# Patient Record
Sex: MALE | Race: WHITE | NOT HISPANIC OR LATINO
[De-identification: names, ages, dates, MRNs, and addresses within clinical notes are randomized per-mention and may not be internally consistent; named-entity substitution may affect disease eponyms.]

---

## 2018-07-18 ENCOUNTER — APPOINTMENT (OUTPATIENT)
Dept: DERMATOLOGY | Facility: CLINIC | Age: 23
End: 2018-07-18
Payer: COMMERCIAL

## 2018-07-18 DIAGNOSIS — H91.90 UNSPECIFIED HEARING LOSS, UNSPECIFIED EAR: ICD-10-CM

## 2018-07-18 DIAGNOSIS — Z87.09 PERSONAL HISTORY OF OTHER DISEASES OF THE RESPIRATORY SYSTEM: ICD-10-CM

## 2018-07-18 DIAGNOSIS — Z80.8 FAMILY HISTORY OF MALIGNANT NEOPLASM OF OTHER ORGANS OR SYSTEMS: ICD-10-CM

## 2018-07-18 DIAGNOSIS — H54.7 UNSPECIFIED VISUAL LOSS: ICD-10-CM

## 2018-07-18 DIAGNOSIS — F32.9 MAJOR DEPRESSIVE DISORDER, SINGLE EPISODE, UNSPECIFIED: ICD-10-CM

## 2018-07-18 PROBLEM — Z00.00 ENCOUNTER FOR PREVENTIVE HEALTH EXAMINATION: Status: ACTIVE | Noted: 2018-07-18

## 2018-07-18 PROCEDURE — 99213 OFFICE O/P EST LOW 20 MIN: CPT

## 2018-11-12 RX ORDER — ADAPALENE 3 MG/G
0.3 GEL TOPICAL DAILY
Qty: 1 | Refills: 3 | Status: DISCONTINUED | COMMUNITY
Start: 2018-07-18 | End: 2018-11-12

## 2019-06-25 ENCOUNTER — APPOINTMENT (OUTPATIENT)
Dept: NEUROLOGY | Facility: CLINIC | Age: 24
End: 2019-06-25
Payer: COMMERCIAL

## 2019-06-25 VITALS
HEART RATE: 97 BPM | SYSTOLIC BLOOD PRESSURE: 130 MMHG | WEIGHT: 262 LBS | HEIGHT: 69 IN | DIASTOLIC BLOOD PRESSURE: 88 MMHG | BODY MASS INDEX: 38.8 KG/M2

## 2019-06-25 DIAGNOSIS — Z82.0 FAMILY HISTORY OF EPILEPSY AND OTHER DISEASES OF THE NERVOUS SYSTEM: ICD-10-CM

## 2019-06-25 DIAGNOSIS — Z78.9 OTHER SPECIFIED HEALTH STATUS: ICD-10-CM

## 2019-06-25 DIAGNOSIS — Z82.61 FAMILY HISTORY OF ARTHRITIS: ICD-10-CM

## 2019-06-25 PROCEDURE — 99205 OFFICE O/P NEW HI 60 MIN: CPT

## 2019-06-25 RX ORDER — DESVENLAFAXINE 50 MG/1
50 TABLET, EXTENDED RELEASE ORAL
Refills: 0 | Status: ACTIVE | COMMUNITY

## 2019-06-25 NOTE — HISTORY OF PRESENT ILLNESS
[FreeTextEntry1] : He reports that he has ADHD.\par He was seeing Dr. Gomes who is a pediatric neurologist and he needs to be transitioned to an adult neurologist.\par He was diagnosed with ADHD at about age 14.\par he was having difficulty focusing in class. He was easily distracted. His mother noticed that he was fidgety.\par His teachers were not complaining of behavior.\par his mother felt that he had "quirky" behaviors and initially brought him to pediatric neurology to be evaluated for autism spectrum disorder. However, when he was evaluated it was noted that his main problem was more of a focus issue.\par He had therapy to help with behavior and socialization when he was younger.\par He also has a lot of anxiety. He has some depression but more of an issue with anxiety.\par He started on medication at about age 14. He started with Concerta. It worked but he had side effects (hallucinations). He was initially switched to Vyvanse. He started at a dose of 30-40 mg and it was gradually increased.\par He did develop some "anxious behaviors" with Vyvanse such as biting his nails. However, he cannot say that he has worsened anxiety with Vyvanse.\par He graduated with a GPA above 100.\par He went to college and graduated with a BA in Biology. 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 He will take a booster of 10 mg of dextroamphetamine at about 6-8 PM. \par He takes Vyvanse on the weekends but does not take dextroamphetamine on the weekends.\par Other than nail biting he is not aware of any side effects from his stimulants.\par He denies having insomnia or palpitations. He had some appetite suppression which is no longer an issue.\par \par He does see a psychiatrist. He finds Pristiq to be helpful. He has not been on other antidepressants.\par \par He sleeps for about 7 hours per night. He is not aware of snoring. \par He does not wake up feeling like he is choking or gasping for air.\par \par He says that he is usually well organized after getting some professional help in this area.\par \par He has a twin sister who has some "quirky" issues but his mother feels that they are not as significant.

## 2019-06-25 NOTE — REVIEW OF SYSTEMS
[As Noted in HPI] : as noted in HPI [Negative] : Genitourinary [Loss Of Hearing] : hearing loss [de-identified] : difficulty concentrating

## 2019-06-25 NOTE — DISCUSSION/SUMMARY
[FreeTextEntry1] : Mr. Terrell is a 24 year old man with a history of attention problems. \par He was diagnosed with ADHD about 10 years ago and has been successfully treated with Vyvanse.\par He also struggles with anxiety and depression for which he is seeing psychiatry and is being treated with desvenlafaxine.\par Other than bilateral hearing loss, he has a non-focal neurological examination. \par \par I will request records of his previous studies from Dr. Gomes.\par I will order a neurotrax cognitive test to confirm that ADHD is still the main problem.\par He should not take his stimulants until after the test.\par \par Continue Vyvanse 70 mg daily for now. If nervous tics persist, we can consider a trial of a non-stimulant medication such as Strattera.\par He can continue to use dextroamphetamine 10 mg as a booster in the evenings on work days.\par \par We discussed the importance of nonpharmacologic strategies to help with ADHD including good sleep, healthy diet, regular exercise, staying well organized.\par \par Continue follow-up with psychiatry.\par \par I will follow up with him after his cognitive testing.

## 2019-06-25 NOTE — PHYSICAL EXAM
[FreeTextEntry1] : Examination:\par Constitutional: normal, no apparent distress\par Eyes: normal conjunctiva b/l, no ptosis, visual fields full\par Respiratory: no respiratory distress, normal effort, normal auscultation\par Cardiovascular: normal rate, rhythm, no murmurs\par Neck: supple, no masses\par Vascular: carotids normal\par Skin: normal color, no rashes\par Psych: normal mood, affect\par \par Neurological:\par Memory: normal memory, oriented to person, place, time\par Language intact/no aphasia\par Cranial Nerves:bilateral hearing loss, Pupils equally round and reactive to light, ocular muscles/movements intact, no ptosis, no facial weakness, tongue protrudes normally in the midline, \par Motor: normal tone, no pronator drift, full strength in all four extremities in the proximal and distal muscle groups\par Coordination: Fine motor movements intact, rapid alternating movements intact, finger to nose intact bilaterally\par Sensory: intact to light touch, vibration, joint position sense, negative Romberg examination\par DTRs: symmetric, 2+ in b/l triceps, 2+ in b/l biceps, 2+ in b/l brachioradialis, 2+ in bilateral patellars, 2+ in bilateral Achilles, Babinskis negative bilaterally\par Gait: narrow based, steady, able to walk on heels, toes, tandem gait\par \par

## 2019-06-25 NOTE — CONSULT LETTER
[Dear  ___] : Dear  [unfilled], [Consult Letter:] : I had the pleasure of evaluating your patient, [unfilled]. [Please see my note below.] : Please see my note below. [Consult Closing:] : Thank you very much for allowing me to participate in the care of this patient.  If you have any questions, please do not hesitate to contact me. [FreeTextEntry3] : Sincerely,\par \par \par An Singh MD\par Diplomate, American Academy of Psychiatry and Neurology\par Board Certified in the Subspecialty of Clinical Neurophysiology\par Board Certified in the Subspecialty of Sleep Medicine\par Board Certified in the Subspecialty of Epilepsy\par  [FreeTextEntry2] : Robinson Morales

## 2019-07-16 ENCOUNTER — APPOINTMENT (OUTPATIENT)
Dept: DERMATOLOGY | Facility: CLINIC | Age: 24
End: 2019-07-16
Payer: COMMERCIAL

## 2019-07-16 PROCEDURE — 99214 OFFICE O/P EST MOD 30 MIN: CPT

## 2019-07-16 RX ORDER — CLOBETASOL PROPIONATE 0.5 MG/G
0.05 AEROSOL, FOAM TOPICAL
Qty: 1 | Refills: 3 | Status: ACTIVE | COMMUNITY
Start: 2019-07-16 | End: 1900-01-01

## 2019-07-16 NOTE — HISTORY OF PRESENT ILLNESS
[de-identified] : Pt. c/o itching, scratching on scalp\par using baby shampoo\par started few mos ago; just started job working in lab\par

## 2019-07-16 NOTE — PHYSICAL EXAM
[FreeTextEntry3] : Skin examination performed of the face, neck, chest, hands, lower legs;\par The patient is well, alert and oriented, pleasant and cooperative.\par Eyelids, conjunctivae, oral mucosa, digits and nails all normal.  \par No cervical adenopathy.\par \par \par Erythematous scaling plaques with adherent scale; scattered in scalp, also concentrated behind hearing aids\par small plaques on both elbows;

## 2019-07-16 NOTE — ASSESSMENT
[FreeTextEntry1] : Psoriasis;  limited to scalp, \par TGel shampoo\par Olux foam BID prn  (ZCP) \par f/u in fall to monitor for exacerbation during winter; \par

## 2019-07-24 ENCOUNTER — APPOINTMENT (OUTPATIENT)
Dept: NEUROLOGY | Facility: CLINIC | Age: 24
End: 2019-07-24
Payer: COMMERCIAL

## 2019-07-24 VITALS
HEIGHT: 69 IN | DIASTOLIC BLOOD PRESSURE: 83 MMHG | SYSTOLIC BLOOD PRESSURE: 138 MMHG | WEIGHT: 262 LBS | HEART RATE: 82 BPM | BODY MASS INDEX: 38.8 KG/M2

## 2019-07-24 PROCEDURE — 96132 NRPSYC TST EVAL PHYS/QHP 1ST: CPT | Mod: 59

## 2019-07-24 PROCEDURE — 99213 OFFICE O/P EST LOW 20 MIN: CPT

## 2019-07-24 NOTE — PROCEDURE
[FreeTextEntry1] : Neurotrax 7/24/19:\par Global Cognitive Score 101.6\par Memory 102.6\par Executive Function 108.3\par ATtention 104.5\par Information Processing Speed 99.7\par Visual Spatial 106.4\par Verbal function 97.8\par Motor Skills 92.1\par More than 1 standard deviation below average in no domains.\par Below average in information processing speed, verbal function, motor skills\par Above average in memory, executive function, attention, visual spatial and on global score\par

## 2019-07-24 NOTE — CONSULT LETTER
[Dear  ___] : Dear  [unfilled], [Consult Letter:] : I had the pleasure of evaluating your patient, [unfilled]. [Please see my note below.] : Please see my note below. [Consult Closing:] : Thank you very much for allowing me to participate in the care of this patient.  If you have any questions, please do not hesitate to contact me. [FreeTextEntry2] : Robinson Morales [FreeTextEntry3] : Sincerely,\par \par \par An Singh MD\par Diplomate, American Academy of Psychiatry and Neurology\par Board Certified in the Subspecialty of Clinical Neurophysiology\par Board Certified in the Subspecialty of Sleep Medicine\par Board Certified in the Subspecialty of Epilepsy\par

## 2019-07-24 NOTE — DATA REVIEWED
[de-identified] : Neurotrax 7/24/19:\par Global Cognitive Score 101.6\par Memory 102.6\par Executive Function 108.3\par ATtention 104.5\par Information Processing Speed 99.7\par Visual Spatial 106.4\par Verbal function 97.8\par Motor Skills 92.1

## 2019-07-24 NOTE — HISTORY OF PRESENT ILLNESS
[FreeTextEntry1] : I last saw Mr. Terrell on 6/25/19.\par He did not take his Vyvanse before the neurotrax test.\par He has not noticed any new problems.\par he denies having any new side effects with Vyvanse. He still has some nervous habits when taking it such as biting his nails. \par He does find that his attention is much improved when he is taking Vyvanse. \par He was just hired full time at his job. Sometimes he gets home very late. His shift is from 4 PM until 12:30 AM.\par He usually takes his Vyvanse in the morning at around 9 AM to help him focus on tasks/errands. On most days he will take adderall 10 mg at about 8 PM to help him finish his shift.

## 2019-07-24 NOTE — DISCUSSION/SUMMARY
[FreeTextEntry1] : Mr. Terrell is a 24 year old man with a history of attention problems. \par He was diagnosed with ADHD about 10 years ago and has been successfully treated with Vyvanse.\par He also struggles with anxiety and depression for which he is seeing psychiatry and is being treated with desvenlafaxine.\par Other than bilateral hearing loss, he has a non-focal neurological examination. \par \par His neurotrax testing is not classic for ADHD. He has a normal attention score.\par Memory score is normal as well.\par However, this is not a perfect test and he finds a clinical benefit with Vyvanse.\par Continue Vyvanse 70 mg daily.\par He can try to take this closer to the start of his work shift if feasible.\par Continue Adderall 10 mg as needed in the evening.\par \par Continue to focus on nonpharmacologic strategies for ADHD including sufficient sleep time, healthy diet, regular exercise, staying well organized, using to-do lists.\par \par follow-up in six months, sooner if needed.

## 2019-09-18 ENCOUNTER — APPOINTMENT (OUTPATIENT)
Dept: DERMATOLOGY | Facility: CLINIC | Age: 24
End: 2019-09-18
Payer: COMMERCIAL

## 2019-09-18 PROCEDURE — 99214 OFFICE O/P EST MOD 30 MIN: CPT

## 2019-09-18 RX ORDER — MINOCYCLINE HYDROCHLORIDE 100 MG/1
100 CAPSULE ORAL DAILY
Qty: 30 | Refills: 2 | Status: DISCONTINUED | COMMUNITY
Start: 2018-07-18 | End: 2019-09-18

## 2019-09-18 NOTE — HISTORY OF PRESENT ILLNESS
[de-identified] : f/u for check of psoriasis;  has worsened since last visit, some relief with olux, but now with very thick areas on scalp, also on body

## 2019-09-18 NOTE — PHYSICAL EXAM
[FreeTextEntry3] : Erythematous scaling plaques with adherent scale; very thick on scalp, behind ears (under hearing aids), elbows, feet, scattered guttate lesions over trunk

## 2019-09-18 NOTE — ASSESSMENT
[FreeTextEntry1] : Psoriasis;  discussed options including UV, biologics; \par discussed that UV may not treat scalp;  \par will opt for Humira;  r/b explained;\par sent Rx to [facility] for starter kit; \par f/u pending aproval;

## 2019-10-01 ENCOUNTER — APPOINTMENT (OUTPATIENT)
Dept: DERMATOLOGY | Facility: CLINIC | Age: 24
End: 2019-10-01
Payer: COMMERCIAL

## 2019-10-01 PROCEDURE — 99213 OFFICE O/P EST LOW 20 MIN: CPT

## 2019-10-22 ENCOUNTER — OTHER (OUTPATIENT)
Age: 24
End: 2019-10-22

## 2020-01-06 ENCOUNTER — OTHER (OUTPATIENT)
Age: 25
End: 2020-01-06

## 2020-01-07 ENCOUNTER — OTHER (OUTPATIENT)
Age: 25
End: 2020-01-07

## 2020-01-14 ENCOUNTER — APPOINTMENT (OUTPATIENT)
Dept: NEUROLOGY | Facility: CLINIC | Age: 25
End: 2020-01-14
Payer: COMMERCIAL

## 2020-01-14 VITALS
BODY MASS INDEX: 39.99 KG/M2 | HEART RATE: 76 BPM | SYSTOLIC BLOOD PRESSURE: 137 MMHG | HEIGHT: 69 IN | DIASTOLIC BLOOD PRESSURE: 84 MMHG | WEIGHT: 270 LBS

## 2020-01-14 PROCEDURE — 99213 OFFICE O/P EST LOW 20 MIN: CPT

## 2020-01-14 NOTE — HISTORY OF PRESENT ILLNESS
[FreeTextEntry1] : I last saw Mr. Terrell on 7/24/19.\par He continues to work 4PM -12:30 AM.\par When he takes Vyvanse 70 mg he does not have difficulty focusing or staying awake. He uses dextroamphetamine 10 mg as needed. He tends to use this about 3 out of 5 works days.\par \par He sleeps for about 6-8 hours per night.\par He denies having any new side effects.\par He still has some nail biting/nervous habits but denies feeling anxiety.\par \par He describes his mood as "okay".\par His desvenlafaxine dose has been increased to 100 mg.\par \par

## 2020-01-14 NOTE — DATA REVIEWED
[de-identified] : Neurotrax 7/24/19:\par Global Cognitive Score 101.6\par Memory 102.6\par Executive Function 108.3\par ATtention 104.5\par Information Processing Speed 99.7\par Visual Spatial 106.4\par Verbal function 97.8\par Motor Skills 92.1

## 2020-01-14 NOTE — DISCUSSION/SUMMARY
[FreeTextEntry1] : Mr. Terrell is a 24 year old man with a history of attention problems. \par He was diagnosed with ADHD in childhood and has been successfully treated with Vyvanse.\par He also struggles with anxiety and depression for which he is seeing psychiatry and is being treated with desvenlafaxine.\par Other than bilateral hearing loss, he has a non-focal neurological examination. \par \par He does have a narrow oropharynx. He is not sure about snoring but will ask his family and if family reports snoring, will get a home sleep study.\par \par -Continue Vyvanse 70 mg/daily. Last sent on 1/7/20. Does not need prescription today. \par -Adderall 10 mg/day prn - refill sent today. I-stop checked.\par \par Continue to focus on nonpharmacologic strategies for ADHD including sufficient sleep time, healthy diet, regular exercise, staying well organized, using to-do lists.\par \par follow-up in six months, sooner if needed. \par

## 2020-02-28 ENCOUNTER — APPOINTMENT (OUTPATIENT)
Dept: DERMATOLOGY | Facility: CLINIC | Age: 25
End: 2020-02-28

## 2020-04-21 ENCOUNTER — RX RENEWAL (OUTPATIENT)
Age: 25
End: 2020-04-21

## 2020-05-13 ENCOUNTER — APPOINTMENT (OUTPATIENT)
Dept: NEUROLOGY | Facility: CLINIC | Age: 25
End: 2020-05-13
Payer: COMMERCIAL

## 2020-05-13 VITALS — WEIGHT: 270 LBS | BODY MASS INDEX: 39.99 KG/M2 | HEIGHT: 69 IN

## 2020-05-13 DIAGNOSIS — F90.9 ATTENTION-DEFICIT HYPERACTIVITY DISORDER, UNSPECIFIED TYPE: ICD-10-CM

## 2020-05-13 PROCEDURE — 99213 OFFICE O/P EST LOW 20 MIN: CPT | Mod: 95

## 2020-05-13 NOTE — PHYSICAL EXAM
[FreeTextEntry1] : Examination:\par Patient is well appearing\par Neurological Examination:\par Mental Status: Awake, alert. Oriented to person, place, time\par Language: No aphasia\par Cranial Nerves:\par  EOMI, No facial weakness, tongue protrudes in the midline\par Motor Exam: No pronator drift. Barrel roll intact. Fine motor movements intact in hands\par Able to stand up from chair without difficulty\par Sensory: intact on self exam\par Reflexes: Unable to examine\par Cerebellar: Finger to nose intact bilaterally\par gait: narrow based, steady\par \par

## 2020-05-13 NOTE — HISTORY OF PRESENT ILLNESS
[Home] : at home, [unfilled] , at the time of the visit. [Patient] : the patient [Medical Office: ([facility])___] : at the medical office located in  [Self] : self [FreeTextEntry1] : \par This is a telehealth visit that was performed with the originating site at the patient’s home and the distant site of my office at [address] [place] [address].\par Two way audio and visual technology was used. \par Verbal consent to participate in the telephone/video visit was obtained in lieu of in-person signature due to the coronavirus emergency.\par This particular visit occurred during the 2020 COVID-19 emergency. I discussed with the patient the nature of our telehealth visits, that:\par -I would evaluate the patient and recommend diagnostics and treatments based on my assessment.\par -Our sessions are not being recorded.\par -The patient acknowledged the risk of unsecure transmission of his/her information.\par -Our team would provide follow up care in person if/when the patient needs it.\par \par I last saw Mr. Terrell on 1/14/20.\par He says that things are going as well as can be expected.\par His work is still open but since he lives with family members with compromised immune systems and because he takes Humira he took sick leave from work.\par \par He is returning next week.\par \par He is still taking his medication.\par He has not noted any side effects with medication.\par He continues to find Vyvnase to be effective. 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 \par \par \par

## 2020-05-13 NOTE — DISCUSSION/SUMMARY
[FreeTextEntry1] : Mr. Terrell is a 25 year old man with a history of attention problems. \par He was diagnosed with ADHD in childhood and has been successfully treated with Vyvanse.\par He also struggles with anxiety and depression for which he is seeing psychiatry and is being treated with desvenlafaxine.\par \par He does have a narrow oropharynx but his family has not reported snoring. \par \par -Continue Vyvanse 70 mg/daily. Istop checked. Reference # [id number]. Will send refill today.\par -Adderall 10 mg/day prn - He does not need a refill today.\par \par Continue to focus on nonpharmacologic strategies for ADHD including sufficient sleep time, healthy diet, regular exercise, staying well organized, using to-do lists.\par \par Prior to starting school will need to change timing of when he takes medication.\par \par follow-up in four to six months, sooner if needed.

## 2020-06-17 ENCOUNTER — RX RENEWAL (OUTPATIENT)
Age: 25
End: 2020-06-17

## 2020-07-10 ENCOUNTER — APPOINTMENT (OUTPATIENT)
Dept: POPULATION HEALTH | Facility: CLINIC | Age: 25
End: 2020-07-10
Payer: COMMERCIAL

## 2020-07-10 ENCOUNTER — MED ADMIN CHARGE (OUTPATIENT)
Age: 25
End: 2020-07-10

## 2020-07-10 PROCEDURE — 90472 IMMUNIZATION ADMIN EACH ADD: CPT

## 2020-07-10 PROCEDURE — 90715 TDAP VACCINE 7 YRS/> IM: CPT

## 2020-07-10 PROCEDURE — 90675 RABIES VACCINE IM: CPT

## 2020-07-10 PROCEDURE — 90471 IMMUNIZATION ADMIN: CPT

## 2020-07-17 ENCOUNTER — APPOINTMENT (OUTPATIENT)
Dept: POPULATION HEALTH | Facility: CLINIC | Age: 25
End: 2020-07-17
Payer: COMMERCIAL

## 2020-07-17 DIAGNOSIS — Z23 ENCOUNTER FOR IMMUNIZATION: ICD-10-CM

## 2020-07-17 PROCEDURE — 90471 IMMUNIZATION ADMIN: CPT

## 2020-07-17 PROCEDURE — 90675 RABIES VACCINE IM: CPT

## 2020-07-31 ENCOUNTER — APPOINTMENT (OUTPATIENT)
Dept: POPULATION HEALTH | Facility: CLINIC | Age: 25
End: 2020-07-31
Payer: COMMERCIAL

## 2020-07-31 DIAGNOSIS — Z23 ENCOUNTER FOR IMMUNIZATION: ICD-10-CM

## 2020-07-31 PROCEDURE — 90471 IMMUNIZATION ADMIN: CPT

## 2020-07-31 PROCEDURE — 90675 RABIES VACCINE IM: CPT

## 2020-08-25 RX ORDER — DEXTROAMPHETAMINE SACCHARATE, AMPHETAMINE ASPARTATE, DEXTROAMPHETAMINE SULFATE AND AMPHETAMINE SULFATE 2.5; 2.5; 2.5; 2.5 MG/1; MG/1; MG/1; MG/1
10 TABLET ORAL
Qty: 30 | Refills: 0 | Status: ACTIVE | COMMUNITY
Start: 2019-07-24 | End: 1900-01-01

## 2020-09-21 ENCOUNTER — RX RENEWAL (OUTPATIENT)
Age: 25
End: 2020-09-21

## 2020-10-23 ENCOUNTER — APPOINTMENT (OUTPATIENT)
Dept: DERMATOLOGY | Facility: CLINIC | Age: 25
End: 2020-10-23
Payer: COMMERCIAL

## 2020-10-23 VITALS — BODY MASS INDEX: 39.99 KG/M2 | WEIGHT: 270 LBS | HEIGHT: 69 IN

## 2020-10-23 PROCEDURE — 99072 ADDL SUPL MATRL&STAF TM PHE: CPT

## 2020-10-23 PROCEDURE — 99213 OFFICE O/P EST LOW 20 MIN: CPT

## 2020-10-23 NOTE — ASSESSMENT
[FreeTextEntry1] : excellent response;  continue Humira q2 weeks\par check QGold;  Rx given\par f/u q6 months; \par

## 2020-10-23 NOTE — HISTORY OF PRESENT ILLNESS
[de-identified] : f/u for check of psoriasis;  on Humira x 1 year;  doing well, also treating arthritis; \par

## 2020-10-23 NOTE — PHYSICAL EXAM
[FreeTextEntry3] : Skin examination performed of the face, neck, chest, hands, lower legs;\par The patient is well, alert and oriented, pleasant and cooperative.\par Eyelids, conjunctivae, oral mucosa, digits and nails all normal.  \par No cervical adenopathy.\par \par \par trunk clear;  mild residual around ears under hearing aids

## 2021-01-09 ENCOUNTER — APPOINTMENT (OUTPATIENT)
Dept: DERMATOLOGY | Facility: CLINIC | Age: 26
End: 2021-01-09
Payer: COMMERCIAL

## 2021-01-09 VITALS — WEIGHT: 270 LBS | HEIGHT: 69 IN | BODY MASS INDEX: 39.99 KG/M2

## 2021-01-09 PROCEDURE — 99214 OFFICE O/P EST MOD 30 MIN: CPT

## 2021-01-09 PROCEDURE — 99072 ADDL SUPL MATRL&STAF TM PHE: CPT

## 2021-01-09 RX ORDER — TRETINOIN 0.5 MG/G
0.05 CREAM TOPICAL
Qty: 1 | Refills: 3 | Status: ACTIVE | COMMUNITY
Start: 2021-01-09 | End: 1900-01-01

## 2021-01-09 RX ORDER — DEXTROAMPHETAMINE SULFATE 10 MG/1
10 TABLET ORAL
Refills: 0 | Status: DISCONTINUED | COMMUNITY
End: 2021-01-09

## 2021-01-09 NOTE — HISTORY OF PRESENT ILLNESS
[de-identified] : Pt. with new c/o;  acne of face;  worsening last few months; \par was treated in distant past; \par Still on Humira for psoriasis;

## 2021-01-09 NOTE — ASSESSMENT
[FreeTextEntry1] : comedonal acne; \par Therapeutic options and their risks and benefits; along with multiple diagnostic possibilities were discussed at length; risks and benefits of further study were discussed;\par \par tretinoin 0.05 cream qHS;  discussed risks irritation, watch application near eye\par \par f/u 3-4 mos;  repeat tx; \par \par cont Humira for psoriasis

## 2021-04-30 ENCOUNTER — APPOINTMENT (OUTPATIENT)
Dept: DERMATOLOGY | Facility: CLINIC | Age: 26
End: 2021-04-30

## 2021-05-04 ENCOUNTER — RX RENEWAL (OUTPATIENT)
Age: 26
End: 2021-05-04

## 2021-05-08 ENCOUNTER — APPOINTMENT (OUTPATIENT)
Dept: DERMATOLOGY | Facility: CLINIC | Age: 26
End: 2021-05-08
Payer: COMMERCIAL

## 2021-05-08 DIAGNOSIS — L40.9 PSORIASIS, UNSPECIFIED: ICD-10-CM

## 2021-05-08 DIAGNOSIS — L70.0 ACNE VULGARIS: ICD-10-CM

## 2021-05-08 PROCEDURE — 99214 OFFICE O/P EST MOD 30 MIN: CPT

## 2021-05-08 PROCEDURE — 99072 ADDL SUPL MATRL&STAF TM PHE: CPT

## 2021-05-08 RX ORDER — ADALIMUMAB 40MG/0.8ML
40 KIT SUBCUTANEOUS
Qty: 1 | Refills: 0 | Status: DISCONTINUED | COMMUNITY
Start: 2019-09-18 | End: 2021-05-08

## 2021-05-08 RX ORDER — DESVENLAFAXINE 25 MG/1
25 TABLET, EXTENDED RELEASE ORAL
Refills: 0 | Status: DISCONTINUED | COMMUNITY
End: 2021-05-08

## 2021-05-08 RX ORDER — TRETINOIN 0.5 MG/G
0.05 CREAM TOPICAL
Qty: 20 | Refills: 3 | Status: DISCONTINUED | COMMUNITY
Start: 2018-11-12 | End: 2021-05-08

## 2021-05-08 NOTE — HISTORY OF PRESENT ILLNESS
[de-identified] : f/u for check of psoriasis and acne; \par psoriasis doing very well;  on Humira since 2019;  \par acne improved on tretinoin cream;

## 2021-05-08 NOTE — ASSESSMENT
[FreeTextEntry1] : Psoriasis;  good control;  continue Humira q2 weeks; \par Check QGold;  last in 2019\par \par acne;  continue tretinoin cream;  declines acne surgery today; \par \par f/u 6 mos;

## 2021-05-08 NOTE — PHYSICAL EXAM
[FreeTextEntry3] : few residual closed comedones periorbital, cheeks; \par \par scalp, trunk, UEs clear; \par

## 2021-06-11 ENCOUNTER — APPOINTMENT (OUTPATIENT)
Dept: NEUROLOGY | Facility: CLINIC | Age: 26
End: 2021-06-11
Payer: COMMERCIAL

## 2021-06-11 VITALS
BODY MASS INDEX: 42.95 KG/M2 | DIASTOLIC BLOOD PRESSURE: 88 MMHG | TEMPERATURE: 97.2 F | SYSTOLIC BLOOD PRESSURE: 138 MMHG | HEIGHT: 69 IN | WEIGHT: 290 LBS | HEART RATE: 99 BPM

## 2021-06-11 PROCEDURE — 99213 OFFICE O/P EST LOW 20 MIN: CPT

## 2021-06-11 PROCEDURE — 99072 ADDL SUPL MATRL&STAF TM PHE: CPT

## 2021-06-11 NOTE — DISCUSSION/SUMMARY
[FreeTextEntry1] : Mr. Terrell is a 26 year old man with a history of attention problems. \par He was diagnosed with ADHD in childhood and has been successfully treated with Vyvanse.\par He also struggles with anxiety and depression for which he is seeing psychiatry and is being treated with desvenlafaxine.\par \par He does have a narrow oropharynx but his family has not reported snoring. \par \par -Continue Vyvanse 70 mg/daily. Istop checked. Reference #: [id number]\par \par -Adderall 10 mg/day prn - He does not need a refill today.\par \par Continue to focus on nonpharmacologic strategies for ADHD including sufficient sleep time, healthy diet, regular exercise, staying well organized, using to-do lists.\par \par Patient informed that he must be seen at least once every six months to receive prescriptions for controlled substances.\par \par f/u within 6 months, sooner if needed.

## 2021-06-11 NOTE — HISTORY OF PRESENT ILLNESS
[FreeTextEntry1] : Mr. 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 He takes the medication every day. If he does not take Vyvanse he cannot focus while driving.\par He denies palpitations, appetite suppression or difficulty sleeping.\par \par He continues to take Pristiq for anxiety.\par

## 2021-07-06 RX ORDER — LISDEXAMFETAMINE DIMESYLATE 70 MG/1
70 CAPSULE ORAL
Qty: 30 | Refills: 0 | Status: ACTIVE | COMMUNITY
Start: 2018-07-12 | End: 1900-01-01

## 2021-11-17 ENCOUNTER — APPOINTMENT (OUTPATIENT)
Dept: DERMATOLOGY | Facility: CLINIC | Age: 26
End: 2021-11-17

## 2021-11-24 ENCOUNTER — TRANSCRIPTION ENCOUNTER (OUTPATIENT)
Age: 26
End: 2021-11-24

## 2022-04-20 ENCOUNTER — RX RENEWAL (OUTPATIENT)
Age: 27
End: 2022-04-20

## 2022-04-20 RX ORDER — ADALIMUMAB 40MG/0.8ML
40 KIT SUBCUTANEOUS
Qty: 3 | Refills: 1 | Status: ACTIVE | COMMUNITY
Start: 2019-09-24 | End: 1900-01-01

## 2022-12-13 ENCOUNTER — NON-APPOINTMENT (OUTPATIENT)
Age: 27
End: 2022-12-13

## 2023-11-21 ENCOUNTER — NON-APPOINTMENT (OUTPATIENT)
Age: 28
End: 2023-11-21

## 2024-04-26 NOTE — DATA REVIEWED
[de-identified] : Neurotrax 7/24/19:\par Global Cognitive Score 101.6\par Memory 102.6\par Executive Function 108.3\par ATtention 104.5\par Information Processing Speed 99.7\par Visual Spatial 106.4\par Verbal function 97.8\par Motor Skills 92.1 [Person] : oriented to person [Place] : oriented to place [Time] : oriented to time [Concentration Intact] : normal concentrating ability [Visual Intact] : visual attention was ~T not ~L decreased [Naming Objects] : no difficulty naming common objects [Repeating Phrases] : no difficulty repeating a phrase [Writing A Sentence] : no difficulty writing a sentence [Fluency] : fluency intact [Comprehension] : comprehension intact [Reading] : reading intact [Past History] : adequate knowledge of personal past history [Cranial Nerves Optic (II)] : visual acuity intact bilaterally,  visual fields full to confrontation, pupils equal round and reactive to light [Cranial Nerves Oculomotor (III)] : extraocular motion intact [Cranial Nerves Trigeminal (V)] : facial sensation intact symmetrically [Cranial Nerves Facial (VII)] : face symmetrical [Cranial Nerves Vestibulocochlear (VIII)] : hearing was intact bilaterally [Cranial Nerves Glossopharyngeal (IX)] : tongue and palate midline [Cranial Nerves Accessory (XI - Cranial And Spinal)] : head turning and shoulder shrug symmetric [Cranial Nerves Hypoglossal (XII)] : there was no tongue deviation with protrusion [Motor Tone] : muscle tone was normal in all four extremities [Motor Strength] : muscle strength was normal in all four extremities [No Muscle Atrophy] : normal bulk in all four extremities [Sensation Tactile Decrease] : light touch was intact [Abnormal Walk] : normal gait [Balance] : balance was intact [Past-pointing] : there was no past-pointing [Tremor] : no tremor present [2+] : Ankle jerk left 2+ [Plantar Reflex Right Only] : normal on the right [Plantar Reflex Left Only] : normal on the left [FreeTextEntry4] : Flat affect, depressed mood, psychomotor slowing

## 2024-07-30 ENCOUNTER — TRANSCRIPTION ENCOUNTER (OUTPATIENT)
Age: 29
End: 2024-07-30